# Patient Record
Sex: MALE | Race: WHITE | NOT HISPANIC OR LATINO | Employment: FULL TIME | URBAN - METROPOLITAN AREA
[De-identification: names, ages, dates, MRNs, and addresses within clinical notes are randomized per-mention and may not be internally consistent; named-entity substitution may affect disease eponyms.]

---

## 2024-06-05 ENCOUNTER — HOSPITAL ENCOUNTER (EMERGENCY)
Facility: HOSPITAL | Age: 44
Discharge: HOME | End: 2024-06-05
Attending: EMERGENCY MEDICINE
Payer: COMMERCIAL

## 2024-06-05 VITALS
OXYGEN SATURATION: 100 % | DIASTOLIC BLOOD PRESSURE: 148 MMHG | SYSTOLIC BLOOD PRESSURE: 210 MMHG | BODY MASS INDEX: 26.74 KG/M2 | TEMPERATURE: 98 F | HEART RATE: 92 BPM | HEIGHT: 74 IN | WEIGHT: 208.34 LBS | RESPIRATION RATE: 16 BRPM

## 2024-06-05 DIAGNOSIS — S51.812A FOREARM LACERATION, LEFT, INITIAL ENCOUNTER: Primary | ICD-10-CM

## 2024-06-05 PROCEDURE — 2500000005 HC RX 250 GENERAL PHARMACY W/O HCPCS: Performed by: EMERGENCY MEDICINE

## 2024-06-05 PROCEDURE — 12002 RPR S/N/AX/GEN/TRNK2.6-7.5CM: CPT

## 2024-06-05 PROCEDURE — 99283 EMERGENCY DEPT VISIT LOW MDM: CPT | Mod: 25

## 2024-06-05 RX ORDER — LIDOCAINE HYDROCHLORIDE 10 MG/ML
10 INJECTION INFILTRATION; PERINEURAL ONCE
Status: COMPLETED | OUTPATIENT
Start: 2024-06-05 | End: 2024-06-05

## 2024-06-05 RX ADMIN — LIDOCAINE HYDROCHLORIDE 100 MG: 10 INJECTION, SOLUTION INFILTRATION; PERINEURAL at 14:37

## 2024-06-05 ASSESSMENT — LIFESTYLE VARIABLES
EVER FELT BAD OR GUILTY ABOUT YOUR DRINKING: NO
TOTAL SCORE: 0
HAVE YOU EVER FELT YOU SHOULD CUT DOWN ON YOUR DRINKING: NO
EVER HAD A DRINK FIRST THING IN THE MORNING TO STEADY YOUR NERVES TO GET RID OF A HANGOVER: NO
HAVE PEOPLE ANNOYED YOU BY CRITICIZING YOUR DRINKING: NO

## 2024-06-05 ASSESSMENT — PAIN - FUNCTIONAL ASSESSMENT: PAIN_FUNCTIONAL_ASSESSMENT: 0-10

## 2024-06-05 ASSESSMENT — PAIN SCALES - GENERAL: PAINLEVEL_OUTOF10: 1

## 2024-06-05 NOTE — ED PROVIDER NOTES
HPI   Chief Complaint   Patient presents with   • Laceration       Patient presents with a left forearm laceration.  He states he was working on a tractor tape system when a piece of metal cut his left forearm.  Bleeding was controlled by himself.  He complains of pain locally to the area.  His immunizations are up-to-date.  He called EMS and transported him here for further evaluation.  Denies any loss of function of his forearm or wrist movements.                          [place] Coma Scale Score: 15                  Patient History   No past medical history on file.  No past surgical history on file.  No family history on file.  Social History     Tobacco Use   • Smoking status: Not on file   • Smokeless tobacco: Not on file   Substance Use Topics   • Alcohol use: Not on file   • Drug use: Not on file       Physical Exam   ED Triage Vitals [06/05/24 1432]   Temperature Heart Rate Respirations BP   36.7 °C (98 °F) 92 16 (!) 210/148      Pulse Ox Temp Source Heart Rate Source Patient Position   100 % Temporal Monitor --      BP Location FiO2 (%)     -- 21 %       Physical Exam  Constitutional:       Appearance: Normal appearance.   HENT:      Head: Normocephalic and atraumatic.   Eyes:      Extraocular Movements: Extraocular movements intact.      Pupils: Pupils are equal, round, and reactive to light.   Musculoskeletal:         General: Tenderness (Left forearm) present. Normal range of motion.      Cervical back: Normal range of motion.   Skin:     General: Skin is warm and dry.      Comments: 4 cm laceration, linear, left forearm on the dorsal side.  Bleeding controlled   Neurological:      General: No focal deficit present.      Mental Status: He is alert and oriented to person, place, and time.      Motor: No weakness.   Psychiatric:         Behavior: Behavior normal.       Labs Reviewed - No data to display  No orders to display     ED Course & MDM   Diagnoses as of 06/05/24 1504   Forearm laceration, left,  initial encounter       Medical Decision Making  Differentials include forearm laceration.  Imaging studies, if performed, were independently reviewed and interpreted by myself and confirmed by radiologist. EKG(s), if performed, were interpreted by myself.  The patient has full range of motion of his hand, forearm wrist and fingers.  I do not feel any imaging studies are necessary.  His laceration was repaired with 7 sutures.  He will have these out in 7 to 10 days.        Procedure  Laceration Repair    Performed by: Nav Leyva MD  Authorized by: Nav Leyva MD    Consent:     Consent obtained:  Verbal    Risks, benefits, and alternatives were discussed: yes      Risks discussed:  Infection and pain  Universal protocol:     Procedure explained and questions answered to patient or proxy's satisfaction: yes      Patient identity confirmed:  Verbally with patient  Anesthesia:     Anesthesia method:  Local infiltration    Local anesthetic:  Lidocaine 1% w/o epi  Laceration details:     Location: Left forearm.  Pre-procedure details:     Preparation:  Patient was prepped and draped in usual sterile fashion  Treatment:     Wound cleansed with: Wound Cleanser.    Amount of cleaning:  Standard  Skin repair:     Repair method:  Sutures    Suture size:  4-0    Suture material:  Nylon    Suture technique:  Simple interrupted    Number of sutures:  7  Approximation:     Approximation:  Close  Repair type:     Repair type:  Simple  Post-procedure details:     Dressing:  Non-adherent dressing    Procedure completion:  Tolerated well, no immediate complications       Nav Leyva MD  06/05/24 5704

## 2024-09-03 ENCOUNTER — HOSPITAL ENCOUNTER (EMERGENCY)
Facility: HOSPITAL | Age: 44
Discharge: HOME | End: 2024-09-03
Attending: EMERGENCY MEDICINE
Payer: MEDICAID

## 2024-09-03 VITALS
RESPIRATION RATE: 16 BRPM | BODY MASS INDEX: 24.38 KG/M2 | SYSTOLIC BLOOD PRESSURE: 175 MMHG | DIASTOLIC BLOOD PRESSURE: 129 MMHG | WEIGHT: 190 LBS | HEIGHT: 74 IN | TEMPERATURE: 98.3 F | HEART RATE: 95 BPM | OXYGEN SATURATION: 98 %

## 2024-09-03 DIAGNOSIS — H43.813 POSTERIOR VITREOUS DETACHMENT OF BOTH EYES: Primary | ICD-10-CM

## 2024-09-03 PROCEDURE — 99284 EMERGENCY DEPT VISIT MOD MDM: CPT | Performed by: EMERGENCY MEDICINE

## 2024-09-03 PROCEDURE — 99284 EMERGENCY DEPT VISIT MOD MDM: CPT

## 2024-09-03 ASSESSMENT — COLUMBIA-SUICIDE SEVERITY RATING SCALE - C-SSRS
1. IN THE PAST MONTH, HAVE YOU WISHED YOU WERE DEAD OR WISHED YOU COULD GO TO SLEEP AND NOT WAKE UP?: NO
2. HAVE YOU ACTUALLY HAD ANY THOUGHTS OF KILLING YOURSELF?: NO
6. HAVE YOU EVER DONE ANYTHING, STARTED TO DO ANYTHING, OR PREPARED TO DO ANYTHING TO END YOUR LIFE?: NO

## 2024-09-03 NOTE — ED TRIAGE NOTES
Pt presents to ED as a transfer from [facility]. Pt states he woke up around 1100 today with blurred vision and black spots in L eye. Pt was sent to  ED for concern for retinal detachment.

## 2024-09-04 NOTE — ED PROVIDER NOTES
CC: Eye Problem and Hypertension     HPI:   Patient is a 44-year-old male with poorly controlled diabetes on insulin complicated by osteomyelitis presenting due to concerns of possible retinal detachment.  Patient was evaluated at outside facility where a point-of-care ultrasound was concerning for left eye retinal detachment.  Patient had sudden onset shadow appearance over his left eye that is improved slightly.  Patient reports that he still feels blurry vision and impaired vision at the superior portion of his left eye.  He does not know what his insulin regimen is off the top of his head but denies any fevers or chills, nausea or vomiting denies any falls.    Limitations to History: poor healthcare literacy  Additional History Obtained from: wife    PMHx/PSHx:  Per HPI.   - has no past medical history on file.  - has no past surgical history on file.    Social History:  - Tobacco:  has no history on file for tobacco use.   - Alcohol:  has no history on file for alcohol use.   - Drugs:  has no history on file for drug use.     Medications: Reviewed in EMR.     Allergies:  Penicillin g    ???????????????????????????????????????????????????????????????  Triage Vitals:  T 36.8 °C (98.3 °F)  HR 95  BP (!) 190/131  RR 16  O2 98 % None (Room air)    Physical Exam  Vitals and nursing note reviewed.   Constitutional:       General: He is not in acute distress.     Appearance: He is well-developed.   HENT:      Head: Normocephalic and atraumatic.      Nose: No congestion or rhinorrhea.      Mouth/Throat:      Pharynx: No oropharyngeal exudate or posterior oropharyngeal erythema.   Eyes:      General:         Right eye: No discharge.         Left eye: No discharge.      Extraocular Movements: Extraocular movements intact.      Conjunctiva/sclera: Conjunctivae normal.      Pupils: Pupils are equal, round, and reactive to light.      Comments: Reported curtain over L upper eyelid with blurry vision   Cardiovascular:       Rate and Rhythm: Normal rate and regular rhythm.      Heart sounds: No murmur heard.  Pulmonary:      Effort: Pulmonary effort is normal. No respiratory distress.      Breath sounds: Normal breath sounds.   Abdominal:      Palpations: Abdomen is soft.      Tenderness: There is no abdominal tenderness.   Musculoskeletal:         General: No swelling.      Cervical back: Neck supple.   Skin:     General: Skin is warm and dry.      Capillary Refill: Capillary refill takes less than 2 seconds.   Neurological:      Mental Status: He is alert.   Psychiatric:         Mood and Affect: Mood normal.       ???????????????????????????????????????????????????????????????  EKG (per my interpretation):  not obtained    ED Course       Medical Decision Making:  Patient is a 44-year-old male with poorly controlled diabetes on insulin presenting due to concerns of possible retinal detachment.  I spoke to the patient at length and mentioned that there would be evaluation for both vitreous detachment and hemorrhage versus retinal detachment.  Patient verbalized understanding and ophthalmology evaluated the patient.  They noticed bilateral posterior vitreous detachments.  Patient was taken to the eye clinic for further evaluation.  Patient was told to sleep with head of bed up and take his blood pressure and insulin regimen as directed.  He denies any other symptoms of hypertension such as chest pain, shortness of breath, falls.  Patient was discharged in hemodynamically stable condition and has ophthalmology follow-up with the retina specialist.  Patient care was overseen by attending physician agrees with the plan and disposition.    External records reviewed: recent inpatient, clinic, and prior ED notes  Diagnostic imaging independently reviewed/interpreted by me (as reflected in MDM) includes: none  Social Determinants Affecting Care: Poor health literacy  Discussion of management with other providers: attending,  ophthalmology  Prescription Drug Consideration: none  Escalation of Care: none    Impression:   Vitreous detachment, bilateral   Blurry vision    Disposition: Discharge      Procedures ? SmartLinks last updated 9/3/2024 10:15 PM     Leny Zelaya  PGY-2 Emergency Medicine  [facility]     Leny Zelaya MD  Resident  09/03/24 0387

## 2024-09-04 NOTE — CONSULTS
History of Present Illness:  This is a 44 year old male who presents with past medical history of poorly controlled type 2 diabetes (last A1c 14.6 6/2024), HTN, & HLD who presents as a transfer with concerns for possible retinal detachment. Patient states that around 11 AM this morning he noticed while he was driving that something was floating around in his vision, it made his vision slightly more blurry OS and he felt it was moving with his eye movements. He is unable to fully describe this floater but says it is roughly in the shape of a shrimp. He also complains of mild irritation & tearing OS which he feels is related to him rubbing his eye constantly after this had first started.    Patient denies bright flashes of light or new floaters other than described previously, just feels like parts of his vision OS are slightly more hazy than usual.    ROS: Patient denies pain, redness, discharge, decreased vision OD, double vision, blind spots, or flashes. All other systems have been reviewed and are negative.    PMHx: please refer to admission HPI  Medications: please refer to medication reconciliation  Allergies: please refer to patient allergy list  Past Ocular History: as per above HPI  Family History: reviewed and noncontributory to chief ophthalmic complaint  Orientation: Alert and oriented x3, appropriate mood and behavior    Examination:     Base Eye Exam       Visual Acuity (Snellen - Linear)         Right Left    Near sc 20/20 20/20              Tonometry (Tonopen, 10:30 PM)         Right Left    Pressure 20 16              Pupils         Dark Light Shape React APD    Right 4 2 Round Brisk -    Left 4 2 Round Brisk -              Visual Fields         Left Right     Full Full              Extraocular Movement         Right Left     Full Full              Dilation       Both eyes: 1% Mydriacyl & 2.5% [place]  @ 10:30pm                  Additional Tests       Color         Right Left    Ishihara 11/11 11/11                   Slit Lamp and Fundus Exam       External Exam         Right Left    External Normal Normal              Slit Lamp Exam         Right Left    Lids/Lashes 1+ blepharitis 1+ blepharitis    Conjunctiva/Sclera White and quiet White and quiet    Cornea Clear Clear    Anterior Chamber Deep and quiet Deep and quiet    Iris Round and reactive, no NVI Round and reactive, no NVI    Lens tr Nuclear sclerosis tr nuclear scclerosis    Anterior Vitreous Normal vitreous hemorrhage, 2-3+ RBCs              Fundus Exam         Right Left    Disc pink and sharp no NVD difficult to appreciate 2/2 VH    C/D Ratio 0.3 obscured by VH    Macula CWS, hard exudates difficult view    Vessels NV along sup arcade inferior boat-shaped heme    Periphery Patch of NV nasally, round intraretinal heme temporally appears grossly attached. B scan showing vitreous (vit) heme with inferior hyperdense area, no obvious retinal detachment    Poor view OS 2/2  vitreous hemorrhage                    Imaging:  B-scan OS:  No retinal tears/breaks/detachments visualized OS  Evidence of syneresis & vitreous hemorrhage, with large pocket inferiorly    Assessment and Plan:  #Proliferative Diabetic Retinopathy OU  #Vitreous Hemorrhage OS  - Patient with extensive evidence of PDR OU with neovascularization on posterior segment exam  - Entrance testing reassuring with perfect VA, normal intraocular pressure (IOP), no RAPD, and SLE remarkable for no signs of NVI  - Posterior segment exam OS limited by vitreous hemorrhage; however, no tears or detachment seen on exam or with B-scan  - Recommend elevated head of bed, avoid heavy bending or lifting  - Retinal detachment (RD) return precautions discussed including new onset flashes or curtain in vision  - Given that patient is without signs of anterior segment ischemia, is not in need of immediate intervention, suitable for follow up outpatient for management of PDR  - Will arrange for close retina follow-up      Recommend follow-up with  [facility], within 1-2 weeks, will attempt to arrange internally. Please call [phone] ([phone]) to make appointment.    Discussed with MD Sidney Aviles MD    Ophthalmology Adult Pager - [phone]  Ophthalmology Pediatrics Pager - [phone]    For adult follow-up appointments, call: [phone]  For pediatric follow-up appointments, call: [phone]      NOTE: This note is not finalized until attending reviews and signs.       I reviewed the resident/fellow's documentation and discussed the patient with the resident/fellow. I agree with the resident/fellow's medical decision making as documented in the note.     Kole Sharif MD

## 2024-09-04 NOTE — DISCHARGE INSTRUCTIONS
please take your insulin and your blood pressure medicines as directed and follow-up with the retina specialist that has been arranged for you by ophthalmology.  Return if you have any worsening symptoms

## 2024-09-12 ENCOUNTER — APPOINTMENT (OUTPATIENT)
Dept: OPHTHALMOLOGY | Facility: CLINIC | Age: 44
End: 2024-09-12
Payer: MEDICAID

## 2024-09-12 DIAGNOSIS — E11.3513 PROLIFERATIVE DIABETIC RETINOPATHY OF BOTH EYES WITH MACULAR EDEMA ASSOCIATED WITH TYPE 2 DIABETES MELLITUS (MULTI): ICD-10-CM

## 2024-09-12 DIAGNOSIS — H43.813 POSTERIOR VITREOUS DETACHMENT OF BOTH EYES: ICD-10-CM

## 2024-09-12 DIAGNOSIS — E11.3593 PROLIFERATIVE DIABETIC RETINOPATHY OF BOTH EYES WITHOUT MACULAR EDEMA ASSOCIATED WITH TYPE 2 DIABETES MELLITUS (MULTI): Primary | ICD-10-CM

## 2024-09-12 PROCEDURE — 92134 CPTRZ OPH DX IMG PST SGM RTA: CPT

## 2024-09-12 PROCEDURE — 99214 OFFICE O/P EST MOD 30 MIN: CPT

## 2024-09-12 ASSESSMENT — CONF VISUAL FIELD
OD_INFERIOR_TEMPORAL_RESTRICTION: 0
OD_SUPERIOR_NASAL_RESTRICTION: 0
OD_NORMAL: 1
OS_INFERIOR_NASAL_RESTRICTION: 0
OS_SUPERIOR_NASAL_RESTRICTION: 0
OD_INFERIOR_NASAL_RESTRICTION: 0
OS_INFERIOR_TEMPORAL_RESTRICTION: 0
OS_SUPERIOR_TEMPORAL_RESTRICTION: 0
OD_SUPERIOR_TEMPORAL_RESTRICTION: 0
OS_NORMAL: 1

## 2024-09-12 ASSESSMENT — ENCOUNTER SYMPTOMS
HEMATOLOGIC/LYMPHATIC NEGATIVE: 0
CARDIOVASCULAR NEGATIVE: 0
ENDOCRINE NEGATIVE: 0
GASTROINTESTINAL NEGATIVE: 0
EYES NEGATIVE: 1
CONSTITUTIONAL NEGATIVE: 0
PSYCHIATRIC NEGATIVE: 0
ALLERGIC/IMMUNOLOGIC NEGATIVE: 0
MUSCULOSKELETAL NEGATIVE: 0
RESPIRATORY NEGATIVE: 0
NEUROLOGICAL NEGATIVE: 0

## 2024-09-12 ASSESSMENT — SLIT LAMP EXAM - LIDS
COMMENTS: 1+ BLEPHARITIS
COMMENTS: 1+ BLEPHARITIS

## 2024-09-12 ASSESSMENT — VISUAL ACUITY
METHOD: SNELLEN - LINEAR
OS_CC: 20/30
OD_CC+: +2
CORRECTION_TYPE: GLASSES
OD_CC: 20/30
OS_CC+: +2

## 2024-09-12 ASSESSMENT — TONOMETRY
OD_IOP_MMHG: 22
IOP_METHOD: TONOPEN
OS_IOP_MMHG: 20

## 2024-09-12 ASSESSMENT — CUP TO DISC RATIO
OS_RATIO: OBSCURED BY VH
OD_RATIO: 0.3

## 2024-09-12 ASSESSMENT — EXTERNAL EXAM - RIGHT EYE: OD_EXAM: NORMAL

## 2024-09-12 ASSESSMENT — EXTERNAL EXAM - LEFT EYE: OS_EXAM: NORMAL

## 2024-09-12 NOTE — PROGRESS NOTES
Proliferative diabetic retinopathy with macular edema in type II DM, both eyesE11.8321  Vitreous hemorrhage, left eye  - Hx of Diabetes 15 years  - Most recent HbA1c = 14.6 (7/2024)  - Hx of HTN  - No Hx of kidney disease    OCT 09/12/24     - Right eye (OD): HE, trace IRF, . No SRF    - Left eye (OS): focal area of inner retinal thinning, HE. No IRF or SRF    #Plan#:   - Emphasized the importance of blood glucose, BP, and cholestrol level control  - Discussed risks/benefits/alteranatives of treatment options. Patient elects to proceed with laser PRP OU  - Will get PA for LINO or CLAIRE  - RTC in 1 week      #Proliferative Diabetic Retinopathy OU

## 2024-09-20 ENCOUNTER — APPOINTMENT (OUTPATIENT)
Dept: OPHTHALMOLOGY | Facility: CLINIC | Age: 44
End: 2024-09-20
Payer: MEDICAID

## 2024-09-20 DIAGNOSIS — E11.3593 PROLIFERATIVE DIABETIC RETINOPATHY OF BOTH EYES WITHOUT MACULAR EDEMA ASSOCIATED WITH TYPE 2 DIABETES MELLITUS: Primary | ICD-10-CM

## 2024-09-20 PROCEDURE — 67228 TREATMENT X10SV RETINOPATHY: CPT | Mod: LEFT SIDE

## 2024-09-20 ASSESSMENT — EXTERNAL EXAM - RIGHT EYE: OD_EXAM: NORMAL

## 2024-09-20 ASSESSMENT — CONF VISUAL FIELD
OD_SUPERIOR_TEMPORAL_RESTRICTION: 0
OS_NORMAL: 1
OS_SUPERIOR_TEMPORAL_RESTRICTION: 0
OD_INFERIOR_TEMPORAL_RESTRICTION: 0
OS_INFERIOR_TEMPORAL_RESTRICTION: 0
OS_SUPERIOR_NASAL_RESTRICTION: 0
OS_INFERIOR_NASAL_RESTRICTION: 0
OD_NORMAL: 1
OD_INFERIOR_NASAL_RESTRICTION: 0
OD_SUPERIOR_NASAL_RESTRICTION: 0

## 2024-09-20 ASSESSMENT — ENCOUNTER SYMPTOMS
NEUROLOGICAL NEGATIVE: 0
HEMATOLOGIC/LYMPHATIC NEGATIVE: 0
ALLERGIC/IMMUNOLOGIC NEGATIVE: 0
CARDIOVASCULAR NEGATIVE: 0
GASTROINTESTINAL NEGATIVE: 0
RESPIRATORY NEGATIVE: 0
ENDOCRINE NEGATIVE: 0
CONSTITUTIONAL NEGATIVE: 0
PSYCHIATRIC NEGATIVE: 0
EYES NEGATIVE: 1
MUSCULOSKELETAL NEGATIVE: 0

## 2024-09-20 ASSESSMENT — CUP TO DISC RATIO
OS_RATIO: OBSCURED BY VH
OD_RATIO: 0.3

## 2024-09-20 ASSESSMENT — SLIT LAMP EXAM - LIDS
COMMENTS: 1+ BLEPHARITIS
COMMENTS: 1+ BLEPHARITIS

## 2024-09-20 ASSESSMENT — TONOMETRY
IOP_METHOD: TONOPEN
OS_IOP_MMHG: 21
OD_IOP_MMHG: 20

## 2024-09-20 ASSESSMENT — VISUAL ACUITY
OS_CC: 20/40
METHOD: SNELLEN - LINEAR
CORRECTION_TYPE: GLASSES
OD_CC: 20/30
OS_CC+: +2

## 2024-09-20 ASSESSMENT — EXTERNAL EXAM - LEFT EYE: OS_EXAM: NORMAL

## 2024-09-20 NOTE — PROGRESS NOTES
Proliferative diabetic retinopathy with macular edema in type II DM, both eyesE11.3391  Vitreous hemorrhage, left eye  - Hx of Diabetes 15 years  - Most recent HbA1c = 14.6 (7/2024)  - Hx of HTN  - No Hx of kidney disease    OCT 09/20/24     - Right eye (OD): HE, trace IRF, . No SRF    - Left eye (OS): focal areas of inner retinal thinning, HE. No IRF or SRF    #Plan#:   - Emphasized the importance of blood glucose, BP, and cholestrol level control  - Discussed risks/benefits/alteranatives of treatment options. Patient elects to proceed with laser PRP OU  - PRP to inferior retina OS done today.   - Will get PA for LINO or CLAIRE  - RTC in 1 week      #Proliferative Diabetic Retinopathy OU

## 2024-09-26 ENCOUNTER — APPOINTMENT (OUTPATIENT)
Dept: OPHTHALMOLOGY | Facility: CLINIC | Age: 44
End: 2024-09-26
Payer: MEDICAID

## 2024-10-08 ENCOUNTER — OFFICE VISIT (OUTPATIENT)
Dept: OPHTHALMOLOGY | Facility: CLINIC | Age: 44
End: 2024-10-08
Payer: MEDICAID

## 2024-10-08 DIAGNOSIS — E11.3513 PROLIFERATIVE DIABETIC RETINOPATHY OF BOTH EYES WITH MACULAR EDEMA ASSOCIATED WITH TYPE 2 DIABETES MELLITUS: Primary | ICD-10-CM

## 2024-10-08 PROCEDURE — 99213 OFFICE O/P EST LOW 20 MIN: CPT

## 2024-10-08 PROCEDURE — 92134 CPTRZ OPH DX IMG PST SGM RTA: CPT | Mod: BILATERAL PROCEDURE

## 2024-10-08 PROCEDURE — 67228 TREATMENT X10SV RETINOPATHY: CPT | Mod: LEFT SIDE

## 2024-10-08 ASSESSMENT — CUP TO DISC RATIO
OS_RATIO: OBSCURED BY VH
OD_RATIO: 0.3

## 2024-10-08 ASSESSMENT — ENCOUNTER SYMPTOMS
MUSCULOSKELETAL NEGATIVE: 0
NEUROLOGICAL NEGATIVE: 0
PSYCHIATRIC NEGATIVE: 0
GASTROINTESTINAL NEGATIVE: 0
CARDIOVASCULAR NEGATIVE: 0
CONSTITUTIONAL NEGATIVE: 0
ENDOCRINE NEGATIVE: 0
RESPIRATORY NEGATIVE: 0
ALLERGIC/IMMUNOLOGIC NEGATIVE: 0
HEMATOLOGIC/LYMPHATIC NEGATIVE: 0
EYES NEGATIVE: 0

## 2024-10-08 ASSESSMENT — TONOMETRY
OS_IOP_MMHG: 22
IOP_METHOD: GOLDMANN APPLANATION
OD_IOP_MMHG: 23

## 2024-10-08 ASSESSMENT — VISUAL ACUITY
OS_CC: 20/50
CORRECTION_TYPE: GLASSES
OS_CC+: +2
OD_CC+: -2
OD_CC: 20/30
METHOD: SNELLEN - LINEAR

## 2024-10-08 ASSESSMENT — SLIT LAMP EXAM - LIDS
COMMENTS: 1+ BLEPHARITIS
COMMENTS: 1+ BLEPHARITIS

## 2024-10-08 ASSESSMENT — EXTERNAL EXAM - LEFT EYE: OS_EXAM: NORMAL

## 2024-10-08 ASSESSMENT — EXTERNAL EXAM - RIGHT EYE: OD_EXAM: NORMAL

## 2024-10-08 NOTE — PROGRESS NOTES
Proliferative diabetic retinopathy with macular edema in type II DM, both eyesE11.2821  Vitreous hemorrhage, left eye  - Hx of Diabetes 15 years  - Most recent HbA1c = 14.6 (7/2024)  - Hx of HTN  - No Hx of kidney disease    OCT 10/08/24     - Right eye (OD): HE, trace IRF, . No SRF    - Left eye (OS): focal areas of inner retinal thinning, HE. No IRF or SRF    #Plan#:   - Emphasized the importance of blood glucose, BP, and cholestrol level control  - Discussed risks/benefits/alteranatives of treatment options. Patient elects to proceed with laser PRP OU   - s/p PRP inferior OS (9/20/24)  - interval worsening of vitreous hemorrhage OS (inferior retina now mostly obscured.   - PRP to superior retina OS done today (10/8/24)  - patient is having labile glucose and A1c levels, will refer to endocrinology  - Will get PA for LINO or CLAIRE  - RTC in 2 weeks for PRP #1 OD, please dilate OU      #Proliferative Diabetic Retinopathy OU

## 2024-11-05 ENCOUNTER — APPOINTMENT (OUTPATIENT)
Dept: OPHTHALMOLOGY | Facility: CLINIC | Age: 44
End: 2024-11-05
Payer: MEDICAID

## 2024-11-12 ENCOUNTER — APPOINTMENT (OUTPATIENT)
Dept: OPHTHALMOLOGY | Facility: CLINIC | Age: 44
End: 2024-11-12
Payer: MEDICAID

## 2024-11-14 ENCOUNTER — APPOINTMENT (OUTPATIENT)
Dept: OPHTHALMOLOGY | Facility: CLINIC | Age: 44
End: 2024-11-14
Payer: MEDICAID

## 2024-12-03 ENCOUNTER — APPOINTMENT (OUTPATIENT)
Dept: OPHTHALMOLOGY | Facility: CLINIC | Age: 44
End: 2024-12-03
Payer: MEDICAID